# Patient Record
Sex: MALE | Race: WHITE
[De-identification: names, ages, dates, MRNs, and addresses within clinical notes are randomized per-mention and may not be internally consistent; named-entity substitution may affect disease eponyms.]

---

## 2017-06-20 ENCOUNTER — HOSPITAL ENCOUNTER (EMERGENCY)
Dept: HOSPITAL 63 - ER | Age: 42
Discharge: HOME | End: 2017-06-20
Payer: SELF-PAY

## 2017-06-20 VITALS — DIASTOLIC BLOOD PRESSURE: 103 MMHG | SYSTOLIC BLOOD PRESSURE: 162 MMHG

## 2017-06-20 VITALS — WEIGHT: 260 LBS | BODY MASS INDEX: 31.66 KG/M2 | HEIGHT: 76 IN
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
## 2017-06-20 DIAGNOSIS — I10: ICD-10-CM
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
## 2017-06-20 DIAGNOSIS — S39.012A: Primary | ICD-10-CM
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
## 2017-06-20 PROCEDURE — 99283 EMERGENCY DEPT VISIT LOW MDM: CPT

## 2017-06-20 PROCEDURE — 96372 THER/PROPH/DIAG INJ SC/IM: CPT

## 2017-06-20 NOTE — PHYS DOC
Past History


Past Medical History:  No Pertinent History


Past Surgical History:  No Surgical History


Alcohol Use:  Occasionally


Drug Use:  None





Adult General


Chief Complaint


Chief Complaint:  BACK PAIN OR INJURY





HPI


HPI





Patient is a 41 year old male who presents with complaint of back pain. Patient 

states that his symptoms started today. Patient states that he was trying to 

help move a piano while at work when he felt a sudden twinge in his middle 

back. Patient states that he started having burning pain across his back. The 

patient states that currently his pain as "20 out of 10." Patient denies any 

previous history of back injury. Patient denies radiation of pain into his legs 

and denies any loss of bowel or bladder control, saddle anesthesia, or foot 

drop. Patient took 6 ibuprofen tablets approximately 1 hour prior to arrival 

with no relief in symptoms. Patient states that the pain worsens with movement.





Review of Systems


Review of Systems





Constitutional: Denies fever or chills []


Eyes: Denies change in visual acuity, redness, or eye pain []


HENT: Denies nasal congestion or sore throat []


Respiratory: Denies cough or shortness of breath []


Cardiovascular: Denies chest pain or edema []


GI: Denies abdominal pain, nausea, vomiting, bloody stools or diarrhea []


: Denies dysuria or hematuria []


Musculoskeletal: Back pain []


Integument: Denies rash or skin lesions []


Neurologic: Denies headache, focal weakness or sensory changes []





Current Medications


Current Medications





Current Medications








 Medications


  (Trade)  Dose


 Ordered  Sig/Gary  Start Time


 Stop Time Status Last Admin


Dose Admin


 


 Acetaminophen/


 Hydrocodone Bitart


  (Lortab 7.5/325)  1 tab  1X  ONCE  6/20/17 11:20


 6/20/17 11:21  6/20/17 11:15


1 TAB


 


 Orphenadrine


 Citrate


  (Norflex)  60 mg  1X  ONCE  6/20/17 11:20


 6/20/17 11:21  6/20/17 11:14


60 MG











Allergies


Allergies





Allergies








Coded Allergies Type Severity Reaction Last Updated Verified


 


  No Known Drug Allergies    6/20/17 No











Physical Exam


Physical Exam





Constitutional: Alert, afebrile, appears in moderate discomfort. []


HENT: Normocephalic, atraumatic, bilateral external ears normal, oropharynx 

moist, no oral exudates, nose normal. []


Eyes: PERRLA, EOMI, conjunctiva normal, no discharge. [] 


Neck: Normal range of motion, no tenderness, supple, no stridor. [] 


Cardiovascular:Heart rate regular rhythm, no murmur []


Lungs & Thorax:  Bilateral breath sounds clear to auscultation []


Abdomen: Bowel sounds normal, soft, no tenderness, no masses, no pulsatile 

masses. [] 


Skin: Warm, dry, no erythema, no rash. [] 


Back: No midline tenderness, bilateral upper lumbar paraspinous muscle 

tenderness to palpation. [] 


Extremities: No tenderness, no cyanosis, no clubbing, ROM intact, no edema. [] 


Neurologic: Alert and oriented X 3, normal motor function, normal sensory 

function, no focal deficits noted. []





Current Patient Data


Vital Signs





 Vital Signs








  Date Time  Temp Pulse Resp B/P (MAP) Pulse Ox O2 Delivery O2 Flow Rate FiO2


 


6/20/17 11:15   20  95   


 


6/20/17 10:30 97.9 79    Room Air  











EKG


EKG


Not performed []





Radiology/Procedures


Radiology/Procedures


Not performed []





Course & Med Decision Making


Course & Med Decision Making


Pertinent Labs and Imaging studies reviewed. (See chart for details)





The patient was given IM Norflex and oral hydrocodone in the emergency 

department. On reevaluation, patient states his symptoms are improving at this 

time. I spoke to the patient about his elevated blood pressure. He states that 

he has had elevated blood pressure on previous visits to the hospital but is 

not currently on any medication. After discussing with him, we have agreed to 

start patient on amlodipine 5 mg daily. I did recommend that the patient follow-

up with primary doctor in the next week for reevaluation of both his back pain 

in his blood pressure. Advised return emergency department for any worsening 

symptoms. Patient voiced understanding and in agreement with treatment plan.





Dragon Disclaimer


Dragon Disclaimer


This chart was dictated in whole or in part using Voice Recognition software in 

a busy, high-work load, and often noisy Emergency Department environment.  It 

may contain unintended and wholly unrecognized errors or omissions.





Departure


Departure:


Impression:  


 Primary Impression:  


 Low back strain


 Additional Impression:  


 Essential hypertension


Disposition:  01 HOME, SELF-CARE


Condition:  IMPROVED


Referrals:  


PCP,RAJINDER (PCP)


Patient Instructions:  Back Pain, Adult, Hypertension





Additional Instructions:  


Follow-up in one week with your primary doctor for reevaluation of your back 

pain and your high blood pressure. Return to the emergency department for any 

worsening symptoms.


Scripts


Amlodipine Besylate (AMLODIPINE BESYLATE) 5 Mg Tablet


1 TAB PO DAILY, #30 TAB 0 Refills


   Prov: AKIL MUELLER MD         6/20/17 


Ibuprofen (IBUPROFEN) 800 Mg Tablet


1 TAB PO TID Y for PAIN, #20 TAB


   Prov: AKIL MUELLER MD         6/20/17 


Hydrocodone Bit/Acetaminophen (NORCO 7.5-325 TABLET) 1 Each Tablet


1 TAB PO Q6HRS Y for PAIN, #20 TAB 0 Refills


   Prov: AKIL MUELLER MD         6/20/17





Problem Qualifiers








 Primary Impression:  


 Low back strain


 Encounter type:  initial encounter  Qualified Codes:  S39.012A - Strain of 

muscle, fascia and tendon of lower back, initial encounter








AKIL MUELLER MD Jun 20, 2017 11:19

## 2020-08-15 ENCOUNTER — HOSPITAL ENCOUNTER (EMERGENCY)
Dept: HOSPITAL 63 - ER | Age: 45
Discharge: HOME | End: 2020-08-15
Payer: MEDICAID

## 2020-08-15 VITALS
SYSTOLIC BLOOD PRESSURE: 135 MMHG | SYSTOLIC BLOOD PRESSURE: 135 MMHG | DIASTOLIC BLOOD PRESSURE: 76 MMHG | DIASTOLIC BLOOD PRESSURE: 76 MMHG

## 2020-08-15 VITALS — BODY MASS INDEX: 31.73 KG/M2 | HEIGHT: 76 IN | WEIGHT: 260.59 LBS

## 2020-08-15 DIAGNOSIS — L02.212: Primary | ICD-10-CM

## 2020-08-15 PROCEDURE — 99283 EMERGENCY DEPT VISIT LOW MDM: CPT

## 2020-08-15 PROCEDURE — 10060 I&D ABSCESS SIMPLE/SINGLE: CPT

## 2020-08-15 PROCEDURE — 87070 CULTURE OTHR SPECIMN AEROBIC: CPT

## 2020-08-15 NOTE — PHYS DOC
Past History


Past Medical History:  No Pertinent History


Past Surgical History:  No Surgical History


Alcohol Use:  Occasionally


Drug Use:  None





General Adult


EDM:


Chief Complaint:  ABSCESS





HPI:


HPI:


44 year old male presents to the ED for an abscess in his median posterior 

thoracic region. He states that he has had this before over 5 years, for which 

he had to come to the ED to get it drained. He states that it is the same exact 

location and feels the exact same as before.  He began to note the abscess again

forming in the past 4 days.  Since this point is only grown in size.  It is extr

glenn painful to touch, it is fluculant, and erythematous. He has not taken 

anything for pain and it has gotten larger in size and more painful over the 

past 24 hours. His wife is a nurse and she advised him to come to the ED to get 

it drained. There is no radiating pain, no associated loss of sensation or 

paralysis.





Review of Systems:


Review of Systems:


Constitutional: Denies fever or chills 


Eyes: Denies redness or eye pain 


HENT: Denies nasal congestion or sore throat


Respiratory: Denies cough or shortness of breath 


Cardiovascular: Denies chest pain or palpitations


GI: Denies abdominal pain, nausea, or vomiting


: Denies dysuria or hematuria


Musculoskeletal: Denies back pain or joint pain


Integument: Abscess in the posterior thoracic spinal region


Neurologic: Denies headache, focal weakness or sensory changes





Complete systems were reviewed and found to be within normal limits, except as 

documented in this note.





Current Medications:


Current Meds:





Current Medications








 Medications


  (Trade)  Dose


 Ordered  Sig/Gary  Start Time


 Stop Time Status Last Admin


Dose Admin


 


 Clindamycin HCl


  (Cleocin)  300 mg  1X  ONCE  8/15/20 18:15


 8/15/20 18:16 DC  





 


 Diphtheria/


 Pertussis/Tetanus


 Vacc


  (ADACEL TDap


 SYRINGE)  0.5 ml  ONCE ONCE  8/15/20 18:15


 8/15/20 18:16 DC  





 


 Lidocaine/


 Epinephrine


  (Xylocaine


 2%-Epi 1:100,000)  20 ml  1X  ONCE  8/15/20 18:15


 8/15/20 18:16 DC  














Allergies:


Allergies:





Allergies








Coded Allergies Type Severity Reaction Last Updated Verified


 


  No Known Drug Allergies    6/20/17 No











Physical Exam:


PE:


Constitutional: Well developed, well nourished, no acute distress, non-toxic 

appearance


HENT: Normocephalic, atraumatic, oropharynx moist


Eyes: PERRL, EOMI, conjunctiva normal, no discharge


Neck: Normal range of motion, no tenderness, supple


Lungs & Thorax: Bilateral rise and fall of chest equally, no acute respiratory 

distress


Abdomen: Soft, no tenderness


Skin: Painful and erythematous abscess noted on the posterior thoracic spinal 

area which is 5 cm lateral to midline on the right


Back: No tenderness, no CVA tenderness


Extremities: No tenderness, ROM intact, no edema


Neurologic: Alert and oriented X 3, normal motor function, normal sensory 

function, no focal deficits noted


Psychologic: Affect normal, judgment normal





EKG:


EKG:


[]





Radiology/Procedures:


Radiology/Procedures:


[]





Course & Med Decision Making:


Course & Med Decision Making


Patient presents to the emergency department with an abscess in his upper 

posterior thoracic spinal region that is been 3 cm right of midline.  The 

abscess is fluctuant, erythematous, and painful.  We performed an incision and 

drainage of the abscess and packed it appropriately.  We prescribed the patient 

analgesics and antibiotics for the abscess.  There is no complications with the 

procedure. Advised him to follow-up with his PCP within 1 week to review the 

abscess.





Patient stable for discharge with outpatient follow-up with PCP. Discussed 

findings and plan with patient and family, who acknowledge understanding and 

agreement.





Dragon Disclaimer:


Dragon Disclaimer:


This electronic medical record was generated, in whole or in part, using a voice

 recognition dictation system.





Departure


Departure:


Impression:  


   Primary Impression:  


   Abscess


Disposition:  01 HOME/RESIDENCE PRIOR TO ADM


Condition:  STABLE


Referrals:  


PCP,NO (PCP)


Patient Instructions:  Abscess, Care After, Abscess, Easy-to-Read





Additional Instructions:  


REMOVE PACKING IN 24 HOURS. Do not soak your wound.  You may shower.  Clean 

wound daily with soap and water.  Change dressing 2 times daily.  Use over the 

counter antibiotic ointment with each dressing change.


Scripts


Clindamycin Hcl (CLINDAMYCIN HCL) 300 Mg Capsule


1 CAP PO TID for infection for 7 Days, #21 CAP


   Prov: BEKA YEE DO         8/15/20 


Oxycodone Hcl (OXYCODONE HCL IMMED.RELEASE **) 5 Mg Tablet


0.5-1 TAB PO PRN Q6HRS PRN for PAIN, #10 TAB


   Prov: BEKA YEE DO         8/15/20





Justification of Admission:


Justification of Admission:


Justification of Admission Dx:  N/A











BEKA YEE DO             Aug 15, 2020 18:31

## 2020-08-16 ENCOUNTER — HOSPITAL ENCOUNTER (EMERGENCY)
Dept: HOSPITAL 63 - ER | Age: 45
Discharge: HOME | End: 2020-08-16
Payer: MEDICAID

## 2020-08-16 VITALS — WEIGHT: 260.59 LBS | BODY MASS INDEX: 31.73 KG/M2 | HEIGHT: 76 IN

## 2020-08-16 DIAGNOSIS — L76.22: ICD-10-CM

## 2020-08-16 DIAGNOSIS — F10.20: ICD-10-CM

## 2020-08-16 DIAGNOSIS — Z86.2: ICD-10-CM
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
## 2020-08-16 DIAGNOSIS — Z48.01: Primary | ICD-10-CM

## 2020-08-16 DIAGNOSIS — K74.60: ICD-10-CM

## 2020-08-16 DIAGNOSIS — Z87.891: ICD-10-CM

## 2020-08-16 LAB
ALBUMIN SERPL-MCNC: 3.2 G/DL (ref 3.4–5)
ALBUMIN/GLOB SERPL: 0.7 {RATIO} (ref 1–1.7)
ALP SERPL-CCNC: 187 U/L (ref 46–116)
ALT SERPL-CCNC: 20 U/L (ref 16–63)
ANION GAP SERPL CALC-SCNC: 13 MMOL/L (ref 6–14)
AST SERPL-CCNC: 46 U/L (ref 15–37)
BASOPHILS # BLD AUTO: 0.1 X10^3/UL (ref 0–0.2)
BASOPHILS NFR BLD: 1 % (ref 0–3)
BILIRUB SERPL-MCNC: 2.6 MG/DL (ref 0.2–1)
BUN/CREAT SERPL: 10 (ref 6–20)
CA-I SERPL ISE-MCNC: 15 MG/DL (ref 8–26)
CALCIUM SERPL-MCNC: 10.2 MG/DL (ref 8.5–10.1)
CHLORIDE SERPL-SCNC: 104 MMOL/L (ref 98–107)
CO2 SERPL-SCNC: 21 MMOL/L (ref 21–32)
CREAT SERPL-MCNC: 1.5 MG/DL (ref 0.7–1.3)
EOSINOPHIL NFR BLD: 0.4 X10^3/UL (ref 0–0.7)
EOSINOPHIL NFR BLD: 5 % (ref 0–3)
ERYTHROCYTE [DISTWIDTH] IN BLOOD BY AUTOMATED COUNT: 16.4 % (ref 11.5–14.5)
GFR SERPLBLD BASED ON 1.73 SQ M-ARVRAT: 51.1 ML/MIN
GLOBULIN SER-MCNC: 4.8 G/DL (ref 2.2–3.8)
GLUCOSE SERPL-MCNC: 87 MG/DL (ref 70–99)
HCT VFR BLD CALC: 31.2 % (ref 39–53)
HGB BLD-MCNC: 10.3 G/DL (ref 13–17.5)
LYMPHOCYTES # BLD: 1.5 X10^3/UL (ref 1–4.8)
LYMPHOCYTES NFR BLD AUTO: 19 % (ref 24–48)
MCH RBC QN AUTO: 32 PG (ref 25–35)
MCHC RBC AUTO-ENTMCNC: 33 G/DL (ref 31–37)
MCV RBC AUTO: 97 FL (ref 79–100)
MONO #: 0.7 X10^3/UL (ref 0–1.1)
MONOCYTES NFR BLD: 9 % (ref 0–9)
NEUT #: 5.1 X10^3UL (ref 1.8–7.7)
NEUTROPHILS NFR BLD AUTO: 66 % (ref 31–73)
PLATELET # BLD AUTO: 145 X10^3/UL (ref 140–400)
POTASSIUM SERPL-SCNC: 3.9 MMOL/L (ref 3.5–5.1)
PROT SERPL-MCNC: 8 G/DL (ref 6.4–8.2)
RBC # BLD AUTO: 3.2 X10^6/UL (ref 4.3–5.7)
SODIUM SERPL-SCNC: 138 MMOL/L (ref 136–145)
WBC # BLD AUTO: 7.7 X10^3/UL (ref 4–11)

## 2020-08-16 PROCEDURE — 85025 COMPLETE CBC W/AUTO DIFF WBC: CPT

## 2020-08-16 PROCEDURE — 36415 COLL VENOUS BLD VENIPUNCTURE: CPT

## 2020-08-16 PROCEDURE — 80053 COMPREHEN METABOLIC PANEL: CPT

## 2020-08-16 PROCEDURE — 99283 EMERGENCY DEPT VISIT LOW MDM: CPT

## 2020-08-16 PROCEDURE — 85730 THROMBOPLASTIN TIME PARTIAL: CPT

## 2020-08-16 PROCEDURE — 85610 PROTHROMBIN TIME: CPT

## 2020-08-16 NOTE — PHYS DOC
Past History


Past Medical History:  Alcoholism, Anemia


Past Medical History


Chronic cirrhosis of the liver


Past Surgical History


Arm and leg surgeries from prior injuries


Smoking:  Quit Less Than 1 Year


Alcohol Use:  Sober (States last drink about 16 weeks ago)


Drug Use:  None





General Adult


EDM:


Chief Complaint:  POST-OP PROBLEM





HPI:


HPI:





Patient is a 44 year old male who presents for evaluation of a bleeding wound.  

A couple of days ago patient had a cyst lanced on his back by Dr. Lee.  There 

is a hematoma at the site and is having trouble controlling the bleeding.  Blood

pressure stable on arrival.  Patient has a history of cirrhosis of the liver and

anemia.  We are not aware of any known low platelets or clotting factor 

deficiencies.  Patient was in mild distress on arrival.  Patient has a history 

of alcoholism and his last drink was about 16 weeks ago





Review of Systems:


Review of Systems:


Constitutional:  Denies fever or chills 


Eyes:  Denies change in visual acuity 


HENT:  Denies nasal congestion or sore throat 


Respiratory:  Denies cough or shortness of breath 


Cardiovascular:  Denies chest pain or edema 


GI:  Denies abdominal pain, nausea, vomiting, bloody stools or diarrhea 


: Denies dysuria 


Musculoskeletal:  Denies back pain or joint pain 


Integument:  Denies rash, has bleeding wound to back


Neurologic:  Denies headache, focal weakness or sensory changes 


Endocrine:  Denies polyuria or polydipsia 


Lymphatic:  Denies swollen glands 


Psychiatric:  Denies depression or anxiety





Heart Score:


Risk Factors:


Risk Factors:  DM, Current or recent (<one month) smoker, HTN, HLP, family 

history of CAD, obesity.


Risk Scores:


Score 0 - 3:  2.5% MACE over next 6 weeks - Discharge Home


Score 4 - 6:  20.3% MACE over next 6 weeks - Admit for Clinical Observation


Score 7 - 10:  72.7% MACE over next 6 weeks - Early Invasive Strategies





Current Medications:


Current Meds:





Current Medications








 Medications


  (Trade)  Dose


 Ordered  Sig/Gary  Start Time


 Stop Time Status Last Admin


Dose Admin


 


 Gelatin


  (Gelfoam  Size


 12-7mm)  1 each  1X  ONCE  8/16/20 23:00


 8/16/20 23:01 DC  





 


 Sodium Chloride  1,000 ml @ 


 75 mls/hr  1X  ONCE  8/16/20 23:00


 8/17/20 12:19   














Allergies:


Allergies:





Allergies








Coded Allergies Type Severity Reaction Last Updated Verified


 


  No Known Drug Allergies    6/20/17 No











Physical Exam:


PE:





Constitutional: Well developed, well nourished, mild acute distress, non-toxic 

appearance. []


HENT: Normocephalic, atraumatic, bilateral external ears normal, oropharynx 

moist, no oral exudates, nose normal. []


Eyes: PERRL, EOMI, conjunctiva normal, no discharge. [] 


Neck: Normal range of motion, no tenderness, supple. [] 


Cardiovascular:Heart rate regular rhythm, no murmur []


Lungs & Thorax:  Bilateral breath sounds clear to auscultation []


Abdomen: Bowel sounds normal, soft, no tenderness. [] 


Skin: Warm, dry, no erythema, no rash, bleeding skin cyst site measured about 3 

to 4 cm in diameter [] 


Back: mild mid upper back tenderness, no CVA tenderness. [] 


Extremities: No tenderness, no cyanosis, no clubbing, ROM intact, no edema. [] 


Neurologic: Alert and oriented X 3, normal motor function, normal sensory 

function, no focal deficits noted. []


Psychologic: Affect normal, judgement normal, mood normal. []





Current Patient Data:


Labs:





Laboratory Tests








Test


 8/16/20


22:55


 


White Blood Count 7.7 x10^3/uL 


 


Red Blood Count 3.20 x10^6/uL 


 


Hemoglobin 10.3 g/dL 


 


Hematocrit 31.2 % 


 


Mean Corpuscular Volume 97 fL 


 


Mean Corpuscular Hemoglobin 32 pg 


 


Mean Corpuscular Hemoglobin


Concent 33 g/dL 





 


Red Cell Distribution Width 16.4 % 


 


Platelet Count 145 x10^3/uL 


 


Neutrophils (%) (Auto) 66 % 


 


Lymphocytes (%) (Auto) 19 % 


 


Monocytes (%) (Auto) 9 % 


 


Eosinophils (%) (Auto) 5 % 


 


Basophils (%) (Auto) 1 % 


 


Neutrophils # (Auto) 5.1 x10^3uL 


 


Lymphocytes # (Auto) 1.5 x10^3/uL 


 


Monocytes # (Auto) 0.7 x10^3/uL 


 


Eosinophils # (Auto) 0.4 x10^3/uL 


 


Basophils # (Auto) 0.1 x10^3/uL 


 


Prothrombin Time 12.7 SEC 


 


Prothromb Time International


Ratio 1.2 





 


Activated Partial


Thromboplast Time 29 SEC 





 


Sodium Level 138 mmol/L 


 


Potassium Level 3.9 mmol/L 


 


Chloride Level 104 mmol/L 


 


Carbon Dioxide Level 21 mmol/L 


 


Anion Gap 13 


 


Blood Urea Nitrogen 15 mg/dL 


 


Creatinine 1.5 mg/dL 


 


Estimated GFR


(Cockcroft-Gault) 51.1 





 


BUN/Creatinine Ratio 10 


 


Glucose Level 87 mg/dL 


 


Calcium Level 10.2 mg/dL 


 


Total Bilirubin 2.6 mg/dL 


 


Aspartate Amino Transf


(AST/SGOT) 46 U/L 





 


Alanine Aminotransferase


(ALT/SGPT) 20 U/L 





 


Alkaline Phosphatase 187 U/L 


 


Total Protein 8.0 g/dL 


 


Albumin 3.2 g/dL 


 


Albumin/Globulin Ratio 0.7 








Current Medications








 Medications


  (Trade)  Dose


 Ordered  Sig/Gary


 Route


 PRN Reason  Start Time


 Stop Time Status Last Admin


Dose Admin


 


 Gelatin


  (Gelfoam  Size


 12-7mm)  1 each  STK-MED ONCE


 .ROUTE


   8/16/20 22:42


 8/16/20 22:42 DC  





 


 Gelatin


  (Gelfoam  Size


 12-7mm)  1 each  1X  ONCE


 TP


   8/16/20 23:00


 8/16/20 23:01 DC  





 


 Sodium Chloride  1,000 ml @ 


 75 mls/hr  1X  ONCE


 IV


   8/16/20 23:00


 8/17/20 12:19   





 


 Lidocaine/


 Epinephrine


  (Let


  (Lido-Epineph-Tetra)


 Gel)  3 ml  1X  ONCE


 TP


   8/16/20 23:30


 8/16/20 23:31 DC  





 


 Lidocaine/


 Epinephrine


  (Let


  (Lido-Epineph-Tetra)


 Gel)  3 ml  STK-MED ONCE


 TP


   8/16/20 23:05


 8/16/20 23:06 DC  














EKG:


EKG:


[]





Radiology/Procedures:


Radiology/Procedures:


[]





Course & Med Decision Making:


Course & Med Decision Making


Pertinent Labs and Imaging studies reviewed. (See chart for details)





[]





Dragon Disclaimer:


Dragon Disclaimer:


This electronic medical record was generated, in whole or in part, using a voice

 recognition dictation system.





1105 Gelfoam failed.  LET now applied to wound and will place sutures shortly





1150 stable.  Wound appears to be doing well and no longer actively bleeding.  

The combination of left and Gelfoam seem to work.  Patient stable for discharge.

  No significant anemia, coagulopathy or thrombocytopenia present.  Patient does

 not require admission





Departure


Departure:


Impression:  


   Primary Impression:  


   Visit for wound check


   Additional Impressions:  


   Bleeding from wound


   History of cirrhosis of liver


Disposition:  01 HOME/RESIDENCE PRIOR TO ADM


Condition:  STABLE


Referrals:  


PCP,NO (PCP)


Patient Instructions:  Wound Care, Easy-to-Read





Additional Instructions:  


Keep wound clean and dry, change dressing in 24 to 48-hour





Justification of Admission:


Justification of Admission:


Justification of Admission Dx:  N/A











WESLEY LOMAS DO              Aug 16, 2020 23:01

## 2022-02-05 ENCOUNTER — HOSPITAL ENCOUNTER (EMERGENCY)
Dept: HOSPITAL 63 - ER | Age: 47
Discharge: HOME | End: 2022-02-05
Payer: MEDICAID

## 2022-02-05 VITALS — HEIGHT: 76 IN | BODY MASS INDEX: 32.75 KG/M2 | WEIGHT: 268.96 LBS

## 2022-02-05 VITALS — SYSTOLIC BLOOD PRESSURE: 142 MMHG | DIASTOLIC BLOOD PRESSURE: 88 MMHG

## 2022-02-05 DIAGNOSIS — L60.0: Primary | ICD-10-CM

## 2022-02-05 DIAGNOSIS — F10.20: ICD-10-CM

## 2022-02-05 DIAGNOSIS — Z87.891: ICD-10-CM
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
## 2022-02-05 DIAGNOSIS — Z86.2: ICD-10-CM

## 2022-02-05 PROCEDURE — 99283 EMERGENCY DEPT VISIT LOW MDM: CPT

## 2022-02-05 NOTE — PHYS DOC
Past History


Past Medical History:  Alcoholism, Anemia


Smoking:  Quit Less Than 1 Year


Alcohol Use:  Sober


Drug Use:  None





General Adult


EDM:


Chief Complaint:  TOE PROBLEM





HPI:


HPI:


46-year-old male presents with right great toe pain and concern for ingrown 

toenail.  Patient has had issues with this toe for a long time.  It has been bot

patricia him more lately.  His significant other excellently stepped on his toe 

last night and the patient has had unbearable pain since that time.  He has been

having some discharge from the medial side of the nail.  He knows that it is 

ingrown and infected.  He has had to have it cut out in the past.  Patient 

denies fever chills.  He has no other complaints this time.





Review of Systems:


Review of Systems:


Constitutional:  Denies fever or chills 


Eyes:  Denies change in visual acuity 


HENT:  Denies nasal congestion or sore throat 


Respiratory:  Denies cough or shortness of breath 


Cardiovascular:  Denies chest pain or edema 


GI:  Denies abdominal pain, nausea, vomiting, bloody stools or diarrhea 


: Denies dysuria 


Musculoskeletal: Right great toe pain, ingrown toenail


Integument:  Denies rash 


Neurologic:  Denies headache, focal weakness or sensory changes 


Endocrine:  Denies polyuria or polydipsia 


Lymphatic:  Denies swollen glands 


Psychiatric:  Denies depression or anxiety





Allergies:


Allergies:





Allergies








Coded Allergies Type Severity Reaction Last Updated Verified


 


  No Known Drug Allergies    6/20/17 No











Physical Exam:


PE:





Constitutional: Well developed, well nourished, no acute distress, non-toxic 

appearance. []


HENT: Normocephalic, atraumatic, bilateral external ears normal, oropharynx 

moist, no oral exudates, nose normal. []


Eyes: PERRLA, EOMI, conjunctiva normal, no discharge. [] 


Neck: Normal range of motion, no tenderness, supple, no stridor. [] 


Cardiovascular:Heart rate regular rhythm, no murmur []


Lungs & Thorax:  Bilateral breath sounds clear to auscultation []


Abdomen: Bowel sounds normal, soft, no tenderness, no masses, no pulsatile 

masses. [] 


Skin: Patient has a significantly concave great toenail on the right toe with 

evidence of ingrown on the medial side, evidence of recent drainage, surrounding

 cellulitis, callus on the lateral side [] 


Back: No tenderness, no CVA tenderness. [] 


Extremities: No tenderness, no cyanosis, no clubbing, ROM intact, no edema. [] 


Neurologic: Alert and oriented X 3, normal motor function, normal sensory 

function, no focal deficits noted. []


Psychologic: Affect normal, judgement normal, mood normal. []





EKG:


EKG:


[]





Radiology/Procedures:


Radiology/Procedures:


[]





Heart Score:


C/O Chest Pain:  N/A


Risk Factors:


Risk Factors:  DM, Current or recent (<one month) smoker, HTN, HLP, family 

history of CAD, obesity.


Risk Scores:


Score 0 - 3:  2.5% MACE over next 6 weeks - Discharge Home


Score 4 - 6:  20.3% MACE over next 6 weeks - Admit for Clinical Observation


Score 7 - 10:  72.7% MACE over next 6 weeks - Early Invasive Strategies





Course & Med Decision Making:


Course & Med Decision Making


Pertinent Labs and Imaging studies reviewed. (See chart for details)


The patient appears to have paronychia as well as an ingrown toenail.  I will 

place him on Bactrim and give him pain medication.  I will give him resources 

for Dr. Bella, podiatrist.  I have encouraged him to follow-up this week for 

definitive care.  He is stable for discharge at this time.


[]





Dragon Disclaimer:


Dragon Disclaimer:


This electronic medical record was generated, in whole or in part, using a voice

 recognition dictation system.





Departure


Departure:


Impression:  


   Primary Impression:  


   Ingrown toenail of right foot with infection


Disposition:  01 HOME / SELF CARE / HOMELESS


Condition:  STABLE


Referrals:  


PCP,UNKNOWN (PCP)


Patient Instructions:  Cellulitis, Easy-to-Read, Ingrown Toenail





Additional Instructions:  


You should call Dr. Bella, podiatrist on Monday to make an appointment as soon as

 possible.  The office phone number is: [phone].


Scripts


Hydrocodone/Acetaminophen (Hydrocodone-Acetamin 5-325 mg) 1 Each Tablet


1 EACH PO Q4-6HRS PRN for PAIN, #10 TAB


   Prov: ANABELLA MOCTEZUMA DO         2/5/22 


Sulfamethoxazole/Trimethoprim (BACTRIM DS TABLET) 1 Each Tablet


1 TAB PO BID for cellulitis for 7 Days, #14 TAB 0 Refills


   Prov: ANABELLA MOCTEZUMA DO         2/5/22











ANABELLA MOCTEZUMA DO                  Feb 5, 2022 08:49